# Patient Record
(demographics unavailable — no encounter records)

---

## 2019-10-22 NOTE — RAD
DATE: 10/22/2019



EXAM: DIGITAL SCREEN BILAT W/CAD



HISTORY: Routine screening



COMPARISON: 10/14/2018 mammographic exam



This study was interpreted with the benefit of Computerized Aided Detection

(CAD).





Breast Density: SCATTERED The breast parenchyma shows scattered fibroglandular

densities. Breast parenchyma level B.





FINDINGS: Small masses are stable bilaterally and multiple. No suspicious

calcification, dominant mass, or distortion.  





IMPRESSION: Stable







BI-RADS CATEGORY: 1 NEGATIVE



RECOMMENDED FOLLOW-UP: 12M 12 MONTH FOLLOW-UP



PQRS compliance statement: Patient information was entered into a reminder

system with a target due date for the next mammogram.



Mammography is a sensitive method for finding small breast cancers, but it

does not detect them all and is not a substitute for careful clinical

examination.  A negative mammogram does not negate a clinically suspicious

finding and should not result in delay in biopsying a clinically suspicious

abnormality.



"Our facility is accredited by the [employer] Mammography

Program."

## 2020-01-28 NOTE — PHYS DOC
Past Medical History


Past Medical History:  Bronchitis, COPD, Hypertension, Sciatica


Past Surgical History:  Appendectomy, Cholecystectomy, Tonsillectomy


Additional Past Surgical Histo:  BOWEL SX, CARPAL TUNNEL


Alcohol Use:  None


Drug Use:  None





Adult General


Chief Complaint


Chief Complaint:  COUGH





HPI


HPI





60-year-old female with underlying history of COPD, hypertension presents to the

emergency department with complaints of cough, left side pain, fever, chills. 

She describes blood in her urine 2 days however no urinary symptoms. Nothing 

makes her symptoms worse, nothing makes her symptoms better. Patient has a 

headache, visual change, abdominal pain, nausea or vomiting.





Review of Systems


Review of Systems





Constitutional: + fever/chills


HENT: Denies nasal congestion or sore throat []


Respiratory: cough/SOB


Cardiovascular: No additional information not addressed in HPI []


GI: Denies abdominal pain, nausea, vomiting, bloody stools or diarrhea []


: Denies dysuria, + hematuria]


Musculoskeletal:left flank pain


Integument: Denies rash or skin lesions []


Neurologic: Denies headache, focal weakness or sensory changes []


Endocrine: Denies polyuria or polydipsia []





All other systems were reviewed and found to be within normal limits, except as 

documented in this note.





Current Medications


Current Medications





Current Medications








 Medications


  (Trade)  Dose


 Ordered  Sig/Giuliano  Start Time


 Stop Time Status Last Admin


Dose Admin


 


 Albuterol/


 Ipratropium


  (Duoneb)  3 ml  1X  ONCE  20 20:30


 20 20:31 DC 20 20:55


3 ML


 


 Ceftriaxone Sodium


  (Rocephin)  1 gm  1X  ONCE  20 22:15


 20 22:16 DC 20 22:17


1 GM


 


 Sodium Chloride  1,000 ml @ 


 1,000 mls/hr  1X  ONCE  20 22:30


 20 23:29  20 22:33


1,000 MLS/HR











Allergies


Allergies





Allergies








Coded Allergies Type Severity Reaction Last Updated Verified


 


  metronidazole Allergy Intermediate  17 Yes











Physical Exam


Physical Exam





Constitutional: Well developed, well nourished, no acute distress, non-toxic 

appearance. []


HENT: Normocephalic, atraumatic, bilateral external ears normal, oropharynx 

moist, no oral exudates, nose normal. []


Eyes: PERRLA, EOMI, conjunctiva normal, no discharge. [] 


Neck: Normal range of motion, no tenderness, supple, no stridor. [] 


Cardiovascular:Heart rate regular rhythm, no murmur []


Lungs & Thorax:  Bilateral breath sounds clear to auscultation []


Abdomen: Bowel sounds normal, soft, no tenderness, no masses, no pulsatile 

masses. [] 


Skin: Warm, dry, no erythema, no rash. [] 


Back: No tenderness, no CVA tenderness. [] 


Extremities: No tenderness, no cyanosis, no clubbing, ROM intact, no edema. [] 


Neurologic: Alert and oriented X 3, normal motor function, normal sensory 

function, no focal deficits noted. []


Psychologic: Affect normal, judgement normal, mood normal. []





Current Patient Data


Vital Signs





                                   Vital Signs








  Date Time  Temp Pulse Resp B/P (MAP) Pulse Ox O2 Delivery O2 Flow Rate FiO2


 


20 23:12  116 25 145/97 (113) 96 Room Air  


 


20 19:30 99.2       





 99.2       








Lab Values





                                Laboratory Tests








Test


 20


20:30 20


20:40 20


21:00


 


Urine Collection Type Unknown    


 


Urine Color Yellow    


 


Urine Clarity Clear    


 


Urine pH 6.0    


 


Urine Specific Gravity 1.010    


 


Urine Protein


 100 mg/dL


(NEG-TRACE) 


 





 


Urine Glucose (UA)


 Negative mg/dL


(NEG) 


 





 


Urine Ketones (Stick)


 Negative mg/dL


(NEG) 


 





 


Urine Blood Small (NEG)    


 


Urine Nitrite


 Negative (NEG)


 


 





 


Urine Bilirubin


 Negative (NEG)


 


 





 


Urine Urobilinogen Dipstick


 1.0 mg/dL (0.2


mg/dL) 


 





 


Urine Leukocyte Esterase Small (NEG)    


 


Urine RBC


 Occ /HPF (0-2)


 


 





 


Urine WBC


 11-20 /HPF


(0-4) 


 





 


Urine Squamous Epithelial


Cells Mod /LPF  


 


 





 


Urine Bacteria


 Many /HPF


(0-FEW) 


 





 


Urine Mucus Slight /LPF    


 


White Blood Count


 


 7.9 x10^3/uL


(4.0-11.0) 





 


Red Blood Count


 


 4.24 x10^6/uL


(3.50-5.40) 





 


Hemoglobin


 


 12.2 g/dL


(12.0-15.5) 





 


Hematocrit


 


 36.1 %


(36.0-47.0) 





 


Mean Corpuscular Volume


 


 85 fL ()


 





 


Mean Corpuscular Hemoglobin  29 pg (25-35)   


 


Mean Corpuscular Hemoglobin


Concent 


 34 g/dL


(31-37) 





 


Red Cell Distribution Width


 


 14.7 %


(11.5-14.5)  H 





 


Platelet Count


 


 198 x10^3/uL


(140-400) 





 


Neutrophils (%) (Auto)  82 % (31-73)  H 


 


Lymphocytes (%) (Auto)  10 % (24-48)  L 


 


Monocytes (%) (Auto)  5 % (0-9)   


 


Eosinophils (%) (Auto)  1 % (0-3)   


 


Basophils (%) (Auto)  1 % (0-3)   


 


Neutrophils # (Auto)


 


 6.5 x10^3/uL


(1.8-7.7) 





 


Lymphocytes # (Auto)


 


 0.8 x10^3/uL


(1.0-4.8)  L 





 


Monocytes # (Auto)


 


 0.4 x10^3/uL


(0.0-1.1) 





 


Eosinophils # (Auto)


 


 0.1 x10^3/uL


(0.0-0.7) 





 


Basophils # (Auto)


 


 0.1 x10^3/uL


(0.0-0.2) 





 


Sodium Level


 


 141 mmol/L


(136-145) 





 


Potassium Level


 


 3.9 mmol/L


(3.5-5.1) 





 


Chloride Level


 


 102 mmol/L


() 





 


Carbon Dioxide Level


 


 27 mmol/L


(21-32) 





 


Anion Gap  12 (6-14)   


 


Blood Urea Nitrogen


 


 14 mg/dL


(7-20) 





 


Creatinine


 


 1.3 mg/dL


(0.6-1.0)  H 





 


Estimated GFR


(Cockcroft-Gault) 


 41.8  


 





 


BUN/Creatinine Ratio  11 (6-20)   


 


Glucose Level


 


 109 mg/dL


(70-99)  H 





 


Calcium Level


 


 9.4 mg/dL


(8.5-10.1) 





 


Total Bilirubin


 


 0.5 mg/dL


(0.2-1.0) 





 


Aspartate Amino Transferase


(AST) 


 27 U/L (15-37)


 





 


Alanine Aminotransferase (ALT)


 


 18 U/L (14-59)


 





 


Alkaline Phosphatase


 


 123 U/L


()  H 





 


Total Protein


 


 7.5 g/dL


(6.4-8.2) 





 


Albumin


 


 3.6 g/dL


(3.4-5.0) 





 


Albumin/Globulin Ratio


 


 0.9 (1.0-1.7)


L 





 


Lactic Acid Level


 


 


 1.3 mmol/L


(0.4-2.0)





                                Laboratory Tests


20 20:40








                                Laboratory Tests


20 20:40











EKG


EKG


EKG interpretation time , sinus tachycardia, left axis deviation, no 

evidence of ST elevation MI[]





Radiology/Procedures


Radiology/Procedures


[facility]


                    [address] [place] [address]


                                 [phone]


                                        


                                 IMAGING REPORT





                                     Signed





PATIENT: BIN PEACE  ACCOUNT: [id number]     MRN#: [id number]


: 1959           LOCATION: ER              AGE: 60


SEX: F                    EXAM DT: 20         ACCESSION#: [id number]


STATUS: REG ER            ORD. PHYSICIAN: MARY JO ARIAS MD


REASON: Cough/Fever


PROCEDURE: PORTABLE CHEST 1V





PORTABLE CHEST 1V


 


History: Cough. Fever.


 


Comparison: None.


 


Findings:


No consolidation or pleural effusion. Normal heart size. No pneumothorax. 


Calcified right upper lung granuloma.


 


Impression: 


1.  No acute cardiopulmonary process.


 


Electronically signed by: Vishal Munroe DO (2020 8:39 PM) [facility]














DICTATED and SIGNED BY:     VISHAL MUNROE DO


DATE:     20


[]





Course & Med Decision Making


Course & Med Decision Making


Pertinent Labs and Imaging studies reviewed. (See chart for details)





[]60-year-old female with underlying history of COPD, hypertension presents to 

the emergency department with complaints of cough, left side pain, fever, c

hills. She describes blood in her urine 2 days however no urinary symptoms. 

Nothing makes her symptoms worse, nothing makes her symptoms better. Patient has

 a headache, visual change, abdominal pain, nausea or vomiting.





Labs and imaging reviewed, chest x-ray reveals no evidence of acute cardiac pu

lmonary process


Urinalysis reveals urinary tract infection, lactic acid is 1.3


Blood cell count within normal limits 7.9


She received IV fluids 2 L, Rocephin 1 g IV


Recommend discharge home follow up with her primary care physician as an 

outpatient


Return precautions provided





Dragon Disclaimer


Dragon Disclaimer


This electronic medical record was generated, in whole or in part, using a voice

 recognition dictation system.





Departure


Departure


Impression:  


   Primary Impression:  


   UTI (urinary tract infection)


   Additional Impression:  


   Fever


Disposition:   HOME, SELF-CARE


Condition:  IMPROVED


Referrals:  


KATHRYN MONTERO MD (PCP)


Patient Instructions:  Urinary Tract Infection, Easy-to-Read





Additional Instructions:  


Recommend follow up with PCP 3 - 5 days


Return to the ER with worsening symptoms, intractable pain, fever, altered 

mental status


Tylenol/Motrin as needed for pain


Take antibiotics as directed


Chest xray without findings of infection


Scripts


Cephalexin (KEFLEX) 500 Mg Capsule


2 CAP PO Q12HR for 5 Days, #20 CAP


   Prov: MARY JO ARIAS MD         20





Problem Qualifiers








   Primary Impression:  


   UTI (urinary tract infection)


   Urinary tract infection type:  site unspecified  Hematuria presence:  without

    hematuria  Qualified Codes:  N39.0 - Urinary tract infection, site not 

   specified


   Additional Impression:  


   Fever


   Fever type:  unspecified  Qualified Codes:  R50.9 - Fever, unspecified








MARY JO ARIAS MD              2020 20:25

## 2020-01-28 NOTE — RAD
PORTABLE CHEST 1V

 

History: Cough. Fever.

 

Comparison: None.

 

Findings:

No consolidation or pleural effusion. Normal heart size. No pneumothorax. 

Calcified right upper lung granuloma.

 

Impression: 

1.  No acute cardiopulmonary process.

 

Electronically signed by: Vishal Munroe DO (1/28/2020 8:39 PM) [facility] Yes

## 2020-01-29 NOTE — EKG
[facility]

              [address] [place] [address]

Test Date:    2020               Test Time:    19:34:11

Pat Name:     BIN PEACE            Department:   

Patient ID:   [id number]           Room:          

Gender:       F                        Technician:   

:          1959               Requested By: MARY JO ARIAS

Order Number: [id number]           Reading MD:     

                                 Measurements

Intervals                              Axis          

Rate:         105                      P:            -163

MI:           164                      QRS:          -5

QRSD:         76                       T:            35

QT:           304                                    

QTc:          405                                    

                           Interpretive Statements

SINUS TACHYCARDIA

LEFTWARD AXIS

OTHERWISE NORMAL ECG

RI6.01

No previous ECG available for comparison

## 2020-08-16 NOTE — PHYS DOC
Past Medical History


Past Medical History:  Bronchitis, COPD, Diabetes-Type II, Hypertension, 

Sciatica


Past Surgical History:  Appendectomy, Cholecystectomy, Tonsillectomy


Additional Past Surgical Histo:  BOWEL SX, CARPAL TUNNEL


Smoking Status:  Never Smoker


Alcohol Use:  None


Drug Use:  None





General Adult


EDM:


Chief Complaint:  DIZZY/LIGHT HEADED





HPI:


HPI:





Patient is a 60-year-old female with multiple medical problems who presents with

a 3-day history of dizziness.  She describes movement that causes the room to 

start spinning.  She first noticed it while she was sitting in the kitchen a few

days ago when the room slowly started to spin.  She noticed it much more severe 

today anytime she moved her head.  She denies any headache or lateralizing 

neurologic weakness.  She denies any fever or neck pain.  She denies any blurred

or double vision.  She states she has never had vertigo in the past.  []





Review of Systems:


Review of Systems:


Constitutional:   Denies fever or chills. []


Eyes:   Denies change in visual acuity. []


HENT:   Denies nasal congestion or sore throat. [] 


Respiratory:   Denies cough or shortness of breath. [] 


Cardiovascular:   Denies chest pain or edema. [] 


GI:   Denies abdominal pain, nausea, vomiting, bloody stools or diarrhea. [] 


:  Denies dysuria. [] 


Musculoskeletal:   Denies back pain or joint pain. [] 


Integument:   Denies rash. [] 


Neurologic:   Reports dizziness [] 


Endocrine:   Denies polyuria or polydipsia. [] 


Lymphatic:  Denies swollen glands. [] 


Psychiatric:  Denies depression or anxiety. []





Heart Score:


Risk Factors:


Risk Factors:  DM, Current or recent (<one month) smoker, HTN, HLP, family 

history of CAD, obesity.


Risk Scores:


Score 0 - 3:  2.5% MACE over next 6 weeks - Discharge Home


Score 4 - 6:  20.3% MACE over next 6 weeks - Admit for Clinical Observation


Score 7 - 10:  72.7% MACE over next 6 weeks - Early Invasive Strategies





Current Medications:





Current Medications








 Medications


  (Trade)  Dose


 Ordered  Sig/Giuliano  Start Time


 Stop Time Status Last Admin


Dose Admin


 


 Meclizine HCl


  (Antivert)  25 mg  1X  ONCE  8/16/20 11:30


 8/16/20 11:33 DC 8/16/20 12:20


25 MG


 


 Sodium Chloride  1,000 ml @ 


 1,000 mls/hr  1X  ONCE  8/16/20 11:30


 8/16/20 12:29  8/16/20 12:19


1,000 MLS/HR











Allergies:


Allergies:





Allergies








Coded Allergies Type Severity Reaction Last Updated Verified


 


  metronidazole Allergy Intermediate  5/12/17 Yes











Physical Exam:


PE:





Constitutional: Well developed, well nourished, no acute distress, non-toxic 

appearance. []


HENT: Normocephalic, atraumatic, bilateral external ears normal, oropharynx 

moist, no oral exudates, nose normal. []


Eyes: PERRLA, EOMI, conjunctiva normal, no discharge. [] 


Neck: Normal range of motion, no tenderness, supple, no stridor. [] 


Cardiovascular:Heart rate regular rhythm, no murmur []


Lungs & Thorax:  Bilateral breath sounds clear to auscultation []


Abdomen: Bowel sounds normal, soft, no tenderness, no masses, no pulsatile 

masses. [] 


Skin: Warm, dry, no erythema, no rash. [] 


Back: No tenderness, no CVA tenderness. [] 


Extremities: No tenderness, no cyanosis, no clubbing, ROM intact, no edema. [] 


Neurologic: Alert and oriented X 3, normal motor function, normal sensory 

function, no focal deficits noted. []


Psychologic: Affect normal, judgement normal, mood normal. []





Current Patient Data:


Labs:





                                Laboratory Tests








Test


 8/16/20


11:22 8/16/20


11:45


 


White Blood Count


 6.0 x10^3/uL


(4.0-11.0) 





 


Red Blood Count


 3.70 x10^6/uL


(3.50-5.40) 





 


Hemoglobin


 10.9 g/dL


(12.0-15.5)  L 





 


Hematocrit


 32.1 %


(36.0-47.0)  L 





 


Mean Corpuscular Volume


 87 fL ()


 





 


Mean Corpuscular Hemoglobin 30 pg (25-35)   


 


Mean Corpuscular Hemoglobin


Concent 34 g/dL


(31-37) 





 


Red Cell Distribution Width


 14.3 %


(11.5-14.5) 





 


Platelet Count


 186 x10^3/uL


(140-400) 





 


Neutrophils (%) (Auto) 62 % (31-73)   


 


Lymphocytes (%) (Auto) 27 % (24-48)   


 


Monocytes (%) (Auto) 7 % (0-9)   


 


Eosinophils (%) (Auto) 3 % (0-3)   


 


Basophils (%) (Auto) 1 % (0-3)   


 


Neutrophils # (Auto)


 3.8 x10^3/uL


(1.8-7.7) 





 


Lymphocytes # (Auto)


 1.6 x10^3/uL


(1.0-4.8) 





 


Monocytes # (Auto)


 0.4 x10^3/uL


(0.0-1.1) 





 


Eosinophils # (Auto)


 0.2 x10^3/uL


(0.0-0.7) 





 


Basophils # (Auto)


 0.0 x10^3/uL


(0.0-0.2) 





 


Sodium Level


 138 mmol/L


(136-145) 





 


Potassium Level


 4.6 mmol/L


(3.5-5.1) 





 


Chloride Level


 104 mmol/L


() 





 


Carbon Dioxide Level


 26 mmol/L


(21-32) 





 


Anion Gap 8 (6-14)   


 


Blood Urea Nitrogen


 15 mg/dL


(7-20) 





 


Creatinine


 1.0 mg/dL


(0.6-1.0) 





 


Estimated GFR


(Cockcroft-Gault) 56.6  


 





 


BUN/Creatinine Ratio 15 (6-20)   


 


Glucose Level


 197 mg/dL


(70-99)  H 





 


Calcium Level


 9.2 mg/dL


(8.5-10.1) 





 


Total Bilirubin


 0.3 mg/dL


(0.2-1.0) 





 


Aspartate Amino Transferase


(AST) 29 U/L (15-37)


 





 


Alanine Aminotransferase (ALT)


 27 U/L (14-59)


 





 


Alkaline Phosphatase


 81 U/L


() 





 


Troponin I Quantitative


 < 0.017 ng/mL


(0.000-0.055) 





 


Total Protein


 6.3 g/dL


(6.4-8.2)  L 





 


Albumin


 3.1 g/dL


(3.4-5.0)  L 





 


Albumin/Globulin Ratio 1.0 (1.0-1.7)   


 


Urine Collection Type  Void  


 


Urine Color  Yellow  


 


Urine Clarity  Clear  


 


Urine pH


 


 5.0 (<5.0-8.0)





 


Urine Specific Gravity


 


 1.015


(1.000-1.030)


 


Urine Protein


 


 100 mg/dL


(NEG-TRACE)


 


Urine Glucose (UA)


 


 Negative mg/dL


(NEG)


 


Urine Ketones (Stick)


 


 Negative mg/dL


(NEG)


 


Urine Blood  Trace (NEG)  


 


Urine Nitrite


 


 Negative (NEG)





 


Urine Bilirubin


 


 Negative (NEG)





 


Urine Urobilinogen Dipstick


 


 1.0 mg/dL (0.2


mg/dL)


 


Urine Leukocyte Esterase


 


 Negative (NEG)





 


Urine RBC


 


 Occ /HPF (0-2)





 


Urine WBC


 


 5-10 /HPF


(0-4)


 


Urine Squamous Epithelial


Cells 


 Many /LPF  





 


Urine Bacteria


 


 Many /HPF


(0-FEW)


 


Urine Mucus  Marked /LPF  





                                Laboratory Tests


8/16/20 11:22








                                Laboratory Tests


8/16/20 11:22








Vital Signs:





                                   Vital Signs








  Date Time  Temp Pulse Resp B/P (MAP) Pulse Ox O2 Delivery O2 Flow Rate FiO2


 


8/16/20 11:05 97.9 74 18 177/69 (105) 97 Room Air  





 97.9       











EKG:


EKG:


EKG: Normal sinus rhythm rate of 65 without ischemic ST-T changes []





Radiology/Procedures:


Radiology/Procedures:


[]REASON: dizziness - vertigo


PROCEDURE: CT HEAD WO CONTRAST





CT scan of the head without contrast 8/16/2020


 


Clinical History: Dizziness. Vertigo.


 


Technique: Unenhanced, contiguous, 5 mm axial sections were obtained 


through the head.


 


One or more of the following individualized dose reduction techniques were


utilized for this study:


 


1. Automated exposure control.


2. Adjustment of the mA and/or kV according to patient size.


3. Use of iterative reconstruction technique.


 


 


Findings: There is mild generalized parenchymal atrophy. No acute 


parenchymal abnormality is seen. No extra-axial fluid collection is noted.


No skull fracture is seen.


 


Impression:  No acute intracranial abnormality is seen.





Course & Med Decision Making:


Course & Med Decision Making


Pertinent Labs and Imaging studies reviewed. (See chart for details)





[ED course: Evaluation reveals a 60-year-old female with vertigo.  She was given

 meclizine 25 mg in the emergency department which did help alleviate her 

symptoms.  Her CT scan was negative.  Her laboratory studies were essentially 

unremarkable.  I will provide her with a prescription for meclizine to take at 

home.  Furthermore, her urine appears contaminated to me I will not treat this.]





Dragon Disclaimer:


Dragon Disclaimer:


This electronic medical record was generated, in whole or in part, using a voice

recognition dictation system.





Departure


Departure


Impression:  


   Primary Impression:  


   Vertigo


Disposition:  01 HOME, SELF-CARE


Condition:  IMPROVED


Referrals:  


KATHRYN MONTERO MD (PCP)


Patient Instructions:  Benign Positional Vertigo, Vertigo





Additional Instructions:  


Return to the emergency department with any new or concerning symptoms


Scripts


Meclizine Hcl (MECLIZINE HCL) 25 Mg Tablet


1 TAB PO Q8HRS PRN for dizziness, #30 TAB 30 Refills


   Prov: ROXIE HALE DO         8/16/20





Justicifation of Admission Dx:


Justifications for Admission:


Justification of Admission Dx:  No











ROXIE HALE DO             Aug 16, 2020 12:33

## 2020-08-16 NOTE — EKG
[facility]

              [address] [place] [address]

Test Date:    2020               Test Time:    11:10:39

Pat Name:     BIN PEACE            Department:   

Patient ID:   [id number]           Room:          

Gender:       F                        Technician:   

:          1959               Requested By: ROXIE HALE

Order Number: [id number]           Reading MD:     

                                 Measurements

Intervals                              Axis          

Rate:         65                       P:            0

NJ:           162                      QRS:          5

QRSD:         74                       T:            8

QT:           370                                    

QTc:          389                                    

                           Interpretive Statements

SINUS RHYTHM

QRS(T) CONTOUR ABNORMALITY

CONSISTENT WITH INFERIOR INFARCT

AGE UNDETERMINED

ABNORMAL ECG

RI6.01

No previous ECG available for comparison

## 2020-08-16 NOTE — RAD
CT scan of the head without contrast 8/16/2020

 

Clinical History: Dizziness. Vertigo.

 

Technique: Unenhanced, contiguous, 5 mm axial sections were obtained 

through the head.

 

One or more of the following individualized dose reduction techniques were

utilized for this study:

 

1. Automated exposure control.

2. Adjustment of the mA and/or kV according to patient size.

3. Use of iterative reconstruction technique.

 

 

Findings: There is mild generalized parenchymal atrophy. No acute 

parenchymal abnormality is seen. No extra-axial fluid collection is noted.

No skull fracture is seen.

 

Impression:  No acute intracranial abnormality is seen.

 

Electronically signed by: Stepan Bradshaw MD (8/16/2020 12:15 PM) [id number]